# Patient Record
Sex: FEMALE | Race: WHITE | Employment: OTHER | URBAN - METROPOLITAN AREA
[De-identification: names, ages, dates, MRNs, and addresses within clinical notes are randomized per-mention and may not be internally consistent; named-entity substitution may affect disease eponyms.]

---

## 2018-11-30 ENCOUNTER — OFFICE VISIT (OUTPATIENT)
Dept: CARDIOLOGY | Facility: CLINIC | Age: 63
End: 2018-11-30
Payer: COMMERCIAL

## 2018-11-30 VITALS
DIASTOLIC BLOOD PRESSURE: 82 MMHG | SYSTOLIC BLOOD PRESSURE: 125 MMHG | WEIGHT: 148 LBS | OXYGEN SATURATION: 98 % | HEART RATE: 76 BPM

## 2018-11-30 DIAGNOSIS — Z82.49 FAMILY HISTORY OF THORACIC AORTIC ANEURYSM: Primary | ICD-10-CM

## 2018-11-30 PROCEDURE — 99202 OFFICE O/P NEW SF 15 MIN: CPT | Performed by: INTERNAL MEDICINE

## 2018-11-30 RX ORDER — ALBUTEROL SULFATE 90 UG/1
2 AEROSOL, METERED RESPIRATORY (INHALATION) EVERY 6 HOURS
COMMUNITY

## 2018-11-30 RX ORDER — CYCLOSPORINE 0.5 MG/ML
1 EMULSION OPHTHALMIC 2 TIMES DAILY
COMMUNITY

## 2018-11-30 NOTE — PATIENT INSTRUCTIONS
Thank you for coming to the [facility] @ [facility]; please note the following instructions:    1. Echocardiogram on a Friday morning at [place] (will see patient as needed after that)         If you have any questions regarding your visit please contact your care team:     Cardiology  Telephone Number   Katy SERRANO, HARSHAL COLLINS, RN   Liz ASENCIO, GUILLERMO DOUGLASS MA   [phone]    *After hours: [phone]   For scheduling appts:     [phone] or    [phone] (select option 1)    *After hours: [phone]     For the Device Clinic (Pacemakers and ICD's)  RN's :  Jolynn Wise   During business hours: [phone]    *After business hours:  [phone] (select option 4)      Normal test result notifications will be released via [facility] or mailed within 7 business days.  All other test results, will be communicated via telephone once reviewed by your cardiologist.    If you need a medication refill please contact your pharmacy.  Please allow 3 business days for your refill to be completed.    As always, thank you for trusting us with your health care needs!

## 2018-11-30 NOTE — PROGRESS NOTES
Vascular Cardiology Consultation      HPI:     This is a 63 year old female here for evaluation and screening of aortic disease. I see her brother who has early CAD and an aortic aneurysm of 4.8 cm (thoracic). At that time given her brother's age it was deemed likely familial TAAD and not syndromic.     Patient has a history of mild HTN, otherwise she has been healthy. She remains on lisinopril. She is active, nonsmoker. She has 11 grandkids and 2 adult children who are all healthy.       PAST MEDICAL HISTORY  Past Medical History:   Diagnosis Date     Asthma      Hypertension      Seasonal allergies        CURRENT MEDICATIONS  Current Outpatient Prescriptions   Medication Sig Dispense Refill     albuterol (PROAIR HFA/PROVENTIL HFA/VENTOLIN HFA) 108 (90 Base) MCG/ACT inhaler Inhale 2 puffs into the lungs every 6 hours As needed       cycloSPORINE (RESTASIS) 0.05 % ophthalmic emulsion 1 drop 2 times daily       LISINOPRIL PO Take 5 mg by mouth daily       LORAZEPAM PO Take 0.5 mg by mouth As needed         PAST SURGICAL HISTORY:  Past Surgical History:   Procedure Laterality Date     CARPAL TUNNEL RELEASE RT/LT Right 2001       ALLERGIES     Allergies   Allergen Reactions     Sulfa Drugs Rash       FAMILY HISTORY  Family History   Problem Relation Age of Onset     Hypertension Mother      Mental Illness Mother      Other Cancer Mother      Anxiety Disorder Mother      Heart Disease Father      Obesity Brother      Hypertension Brother      Hyperlipidemia Brother      Aneurysm Brother      Anxiety Disorder Brother      Hypertension Sister      Hyperlipidemia Sister      Anxiety Disorder Sister      Hypertension Brother      Hyperlipidemia Brother      Anxiety Disorder Brother      Depression Brother        SOCIAL HISTORY  Social History     Social History     Marital status:      Spouse name: N/A     Number of children: N/A     Years of education: N/A     Occupational History     Not on file.      Social History Main Topics     Smoking status: Former Smoker     Quit date: 6/30/1974     Smokeless tobacco: Never Used     Alcohol use Not on file     Drug use: Not on file     Sexual activity: Not on file     Other Topics Concern     Not on file     Social History Narrative     No narrative on file       ROS:   Constitutional: No fever, chills, or sweats. No weight gain/loss   ENT: No visual disturbance, ear ache, epistaxis, sore throat  Allergies/Immunologic: Negative  Respiratory: No cough, hemoptysia  Cardiovascular: As per HPI  GI: No nausea, vomiting, hematemesis, melena, or hematochezia  : No urinary frequency, dysuria, or hematuria  Integument: Negative  Psychiatric: Negative  Neuro: Negative  Endocrinology: Negative   Musculoskeletal: Negative  Vascular: No walking impairment, claudication, ischemic rest pain or nonhealing wounds    EXAM:  /82 (BP Location: Left arm, Patient Position: Chair, Cuff Size: Adult Regular)  Pulse 76  Wt 67.1 kg (148 lb)  SpO2 98%  In general, the patient is a pleasant female in no apparent distress.    HEENT: NC/AT.  PERRLA.  EOMI.  Sclerae white, not injected.  Nares clear.  Pharynx without erythema or exudate.  Dentition intact.    Neck: No adenopathy.  No thyromegaly. Carotids +2/2 bilaterally without bruits.  No jugular venous distension.   Heart: RRR. Normal S1, S2 splits physiologically. No murmur, rub, click, or gallop. The PMI is in the 5th ICS in the midclavicular line. There is no heave.    Lungs: CTA.  No ronchi, wheezes, rales.  No dullness to percussion.   Abdomen: Soft, nontender, nondistended. No organomegaly. No AAA.  No bruits.   Extremities: No clubbing, cyanosis, or edema.  No wounds. No varicose veins signs of chronic venous insufficiency.   Vascular: No bruits are noted.    Labs:  LIPID RESULTS:  No results found for: CHOL, HDL, LDL, TRIG, CHOLHDLRATIO, NHDL    LIVER ENZYME RESULTS:  No results found for: AST, ALT    CBC RESULTS:  No results  found for: WBC, RBC, HGB, HCT, MCV, MCH, MCHC, RDW, PLT    BMP RESULTS:  No results found for: NA, POTASSIUM, CHLORIDE, CO2, ANIONGAP, GLC, BUN, CR, GFRESTIMATED, GFRESTBLACK, MANI     A1C RESULTS:  No results found for: A1C    Procedures:    Echocardiogram     Interpretation Summary     Borderline dilatation of the ascending aorta at 3.8 cm (normal women 2.7+/-  0.4) and when indexed to BSA 2.2 cm/m2 (normal women 1.6+/-0.3).  The aortic valve is tricuspid.  Study otherwise unremarkable.    Assessment and Plan:     63 year old female with HTN on lisinopril with mildly dilated thoracic ascending aorta. 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     ROS is negative for any features to suggest syndromic thoracic aneurysm.    We discussed genetic testing and counseling, where the variable penetrance and expression of various mutations associated with FTAAD make a definitive diagnosis difficult. TGFBR2 (~5%), ACTA2 (~14%) and MYH11 (~1%) are known mutations that comprise up to 20% of positively tested cases. There is an 80% chance that no identifiable mutation will be discovered in testing. The patient warrants careful surveillance as FTAAD associated aneurysms can rupture/dissect in a more unpredictable manner than degenerative types. Knowing the particular mutation may also prove helpful in understanding the natural history of patient's specific aneurysmal disease and help in preventive repair planning.    Plan:    1) CTA Chest to confirm size now   2) 6 month repeat imaging if confirmed thoracic aneurysm  3) SBP goal < 120, HR < 80 which she is at  4) No heavy lifting > 30 lbs  5) Echocardiography screening for 1st degree relatives  6) Will potentially offer genetic testing and counseling if patient wishes    Radha El MD MSc   Aortopathy Clinic  Staff Cardiologist  Vascular Section  [facility]

## 2018-11-30 NOTE — MR AVS SNAPSHOT
After Visit Summary   11/30/2018    Meeta Jung    MRN: [id number]           Patient Information     Date Of Birth          1955        Visit Information        Provider Department      11/30/2018 10:00 AM Radha El MD [facility] PHYSICIANS HEART AT [facility]        Today's Diagnoses     Family history of thoracic aortic aneurysm    -  1      Care Instructions    Thank you for coming to the [facility] @ [facility]; please note the following instructions:    1. Echocardiogram on a Friday morning at [place] (will see patient as needed after that)         If you have any questions regarding your visit please contact your care team:     Cardiology  Telephone Number   Katy SERRANO, RN  Matt COLLINS, RN   Liz ASENCIO, GUILLERMO DOUGLASS MA   [phone]    *After hours: [phone]   For scheduling appts:     [phone] or    [phone] (select option 1)    *After hours: [phone]     For the Device Clinic (Pacemakers and ICD's)  RN's :  Jolynn Wise   During business hours: [phone]    *After business hours:  [phone] (select option 4)      Normal test result notifications will be released via [employer] or mailed within 7 business days.  All other test results, will be communicated via telephone once reviewed by your cardiologist.    If you need a medication refill please contact your pharmacy.  Please allow 3 business days for your refill to be completed.    As always, thank you for trusting us with your health care needs!            Follow-ups after your visit        Your next 10 appointments already scheduled     Dec 14, 2018 10:00 AM CST   Ech Complete with [id number]   [facility] [place] [facility])    [address] [place] [id number]   [phone]           1.  Please bring or wear a comfortable two-piece outfit. 2.  You may eat, drink and take your normal medicines. 3.   "For any questions that cannot be answered, please contact the ordering physician 4.  Please do not wear perfumes or scented lotions on the day of your exam.              Future tests that were ordered for you today     Open Future Orders        Priority Expected Expires Ordered    Echocardiogram Complete Routine  2019            Who to contact     If you have questions or need follow up information about today's clinic visit or your schedule please contact [facility] PHYSICIANS HEART AT [facility] directly at [phone].  Normal or non-critical lab and imaging results will be communicated to you by [employer], letter or phone within 4 business days after the clinic has received the results. If you do not hear from us within 7 days, please contact the clinic through [employer] or phone. If you have a critical or abnormal lab result, we will notify you by phone as soon as possible.  Submit refill requests through [employer] or call your pharmacy and they will forward the refill request to us. Please allow 3 business days for your refill to be completed.          Additional Information About Your Visit        [facility] Information     [facility] lets you send messages to your doctor, view your test results, renew your prescriptions, schedule appointments and more. To sign up, go to [email or web address] . Click on \"Log in\" on the left side of the screen, which will take you to the Welcome page. Then click on \"Sign up Now\" on the right side of the page.     You will be asked to enter the access code listed below, as well as some personal information. Please follow the directions to create your username and password.     Your access code is: [email or web address]  Expires: 2019  9:50 AM     Your access code will  in 90 days. If you need help or a new code, please call your [place] clinic or [phone].        Care EveryWhere ID     This is your Care EveryWhere ID. This could be used by other " organizations to access your [place] medical records  [id number]        Your Vitals Were     Pulse Pulse Oximetry                76 98%           Blood Pressure from Last 3 Encounters:   11/30/18 125/82    Weight from Last 3 Encounters:   11/30/18 67.1 kg (148 lb)               Primary Care Provider Office Phone # Fax #    Hillary Burger [phone]       [place] [id number] [facility] [address]        Equal Access to Services     HAYLIE ZACARIAS : Hadii aad ku hadasho Soomaali, waaxda luqadaha, qaybta kaalmada adeegyada, waxay idiin hayaan adeeg kharash la'brittanyn best. So [facility] [phone].    ATENCIÓN: Si habla español, tiene a de leon disposición servicios gratuitos de asistencia lingüística. [facility] [phone].    We comply with applicable federal civil rights laws and [place] laws. We do not discriminate on the basis of race, color, national origin, age, disability, sex, sexual orientation, or gender identity.            Thank you!     Thank you for choosing [facility] PHYSICIANS HEART AT [facility]  for your care. Our goal is always to provide you with excellent care. Hearing back from our patients is one way we can continue to improve our services. Please take a few minutes to complete the written survey that you may receive in the mail after your visit with us. Thank you!             Your Updated Medication List - Protect others around you: Learn how to safely use, store and throw away your medicines at www.disposemymeds.org.          This list is accurate as of 11/30/18 10:54 AM.  Always use your most recent med list.                   Brand Name Dispense Instructions for use Diagnosis    albuterol 108 (90 Base) MCG/ACT inhaler    PROAIR HFA/PROVENTIL HFA/VENTOLIN HFA     Inhale 2 puffs into the lungs every 6 hours As needed        cycloSPORINE 0.05 % ophthalmic emulsion    RESTASIS     1 drop 2 times daily        LISINOPRIL PO      Take 5 mg by mouth daily         LORAZEPAM PO      Take 0.5 mg by mouth As needed

## 2018-11-30 NOTE — NURSING NOTE
Chief Complaint   Patient presents with     New Patient     Ascending aortic anuerysem Hereditary. Pt reports no cardiac systems.        Initial /82 (BP Location: Left arm, Patient Position: Chair, Cuff Size: Adult Regular)  Pulse 76  Wt 67.1 kg (148 lb)  SpO2 98% There is no height or weight on file to calculate BMI..  BP completed using cuff size: regular    Pippa Adair MA

## 2018-11-30 NOTE — LETTER
11/30/2018      RE: Meeta Jung  [address] [place] [id number]       Dear Colleague,    Thank you for the opportunity to participate in the care of your patient, Meeta Jung, at the [facility] PHYSICIANS HEART AT [facility] at [facility]. Please see a copy of my visit note below.         Vascular Cardiology Consultation      HPI:     This is a 63 year old female here for evaluation and screening of aortic disease. I see her brother who has early CAD and an aortic aneurysm of 4.8 cm (thoracic). At that time given her brother's age it was deemed likely familial TAAD and not syndromic.     Patient has a history of mild HTN, otherwise she has been healthy. She remains on lisinopril. She is active, nonsmoker. She has 11 grandkids and 2 adult children who are all healthy.       PAST MEDICAL HISTORY  Past Medical History:   Diagnosis Date     Asthma      Hypertension      Seasonal allergies        CURRENT MEDICATIONS  Current Outpatient Prescriptions   Medication Sig Dispense Refill     albuterol (PROAIR HFA/PROVENTIL HFA/VENTOLIN HFA) 108 (90 Base) MCG/ACT inhaler Inhale 2 puffs into the lungs every 6 hours As needed       cycloSPORINE (RESTASIS) 0.05 % ophthalmic emulsion 1 drop 2 times daily       LISINOPRIL PO Take 5 mg by mouth daily       LORAZEPAM PO Take 0.5 mg by mouth As needed         PAST SURGICAL HISTORY:  Past Surgical History:   Procedure Laterality Date     CARPAL TUNNEL RELEASE RT/LT Right 2001       ALLERGIES     Allergies   Allergen Reactions     Sulfa Drugs Rash       FAMILY HISTORY  Family History   Problem Relation Age of Onset     Hypertension Mother      Mental Illness Mother      Other Cancer Mother      Anxiety Disorder Mother      Heart Disease Father      Obesity Brother      Hypertension Brother      Hyperlipidemia Brother      Aneurysm Brother      Anxiety Disorder Brother      Hypertension Sister      Hyperlipidemia Sister       Anxiety Disorder Sister      Hypertension Brother      Hyperlipidemia Brother      Anxiety Disorder Brother      Depression Brother        SOCIAL HISTORY  Social History     Social History     Marital status:      Spouse name: N/A     Number of children: N/A     Years of education: N/A     Occupational History     Not on file.     Social History Main Topics     Smoking status: Former Smoker     Quit date: 6/30/1974     Smokeless tobacco: Never Used     Alcohol use Not on file     Drug use: Not on file     Sexual activity: Not on file     Other Topics Concern     Not on file     Social History Narrative     No narrative on file       ROS:   Constitutional: No fever, chills, or sweats. No weight gain/loss   ENT: No visual disturbance, ear ache, epistaxis, sore throat  Allergies/Immunologic: Negative  Respiratory: No cough, hemoptysia  Cardiovascular: As per HPI  GI: No nausea, vomiting, hematemesis, melena, or hematochezia  : No urinary frequency, dysuria, or hematuria  Integument: Negative  Psychiatric: Negative  Neuro: Negative  Endocrinology: Negative   Musculoskeletal: Negative  Vascular: No walking impairment, claudication, ischemic rest pain or nonhealing wounds    EXAM:  /82 (BP Location: Left arm, Patient Position: Chair, Cuff Size: Adult Regular)  Pulse 76  Wt 67.1 kg (148 lb)  SpO2 98%  In general, the patient is a pleasant female in no apparent distress.    HEENT: NC/AT.  PERRLA.  EOMI.  Sclerae white, not injected.  Nares clear.  Pharynx without erythema or exudate.  Dentition intact.    Neck: No adenopathy.  No thyromegaly. Carotids +2/2 bilaterally without bruits.  No jugular venous distension.   Heart: RRR. Normal S1, S2 splits physiologically. No murmur, rub, click, or gallop. The PMI is in the 5th ICS in the midclavicular line. There is no heave.    Lungs: CTA.  No ronchi, wheezes, rales.  No dullness to percussion.   Abdomen: Soft, nontender, nondistended. No organomegaly. No  AAA.  No bruits.   Extremities: No clubbing, cyanosis, or edema.  No wounds. No varicose veins signs of chronic venous insufficiency.   Vascular: No bruits are noted.    Labs:  LIPID RESULTS:  No results found for: CHOL, HDL, LDL, TRIG, CHOLHDLRATIO, NHDL    LIVER ENZYME RESULTS:  No results found for: AST, ALT    CBC RESULTS:  No results found for: WBC, RBC, HGB, HCT, MCV, MCH, MCHC, RDW, PLT    BMP RESULTS:  No results found for: NA, POTASSIUM, CHLORIDE, CO2, ANIONGAP, GLC, BUN, CR, GFRESTIMATED, GFRESTBLACK, MANI     A1C RESULTS:  No results found for: A1C    Procedures:    Echocardiogram     Interpretation Summary     Borderline dilatation of the ascending aorta at 3.8 cm (normal women 2.7+/-  0.4) and when indexed to BSA 2.2 cm/m2 (normal women 1.6+/-0.3).  The aortic valve is tricuspid.  Study otherwise unremarkable.    Assessment and Plan:     63 year old female with HTN on lisinopril with mildly dilated thoracic ascending aorta. 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     ROS is negative for any features to suggest syndromic thoracic aneurysm.    We discussed genetic testing and counseling, where the variable penetrance and expression of various mutations associated with FTAAD make a definitive diagnosis difficult. TGFBR2 (~5%), ACTA2 (~14%) and MYH11 (~1%) are known mutations that comprise up to 20% of positively tested cases. There is an 80% chance that no identifiable mutation will be discovered in testing. The patient warrants careful surveillance as FTAAD associated aneurysms can rupture/dissect in a more unpredictable manner than degenerative types. Knowing the particular mutation may also prove helpful in understanding the natural history of patient's specific aneurysmal disease and help in preventive repair planning.    Plan:    1) CTA Chest to confirm size now   2) 6 month repeat imaging if confirmed thoracic aneurysm  3) SBP goal < 120, HR < 80 which she is at  4) No heavy  lifting > 30 lbs  5) Echocardiography screening for 1st degree relatives  6) Will potentially offer genetic testing and counseling if patient wishes    Radha El MD MSc   Aortopathy Clinic  Staff Cardiologist  Vascular Section  [facility]

## 2018-12-14 ENCOUNTER — ANCILLARY PROCEDURE (OUTPATIENT)
Dept: CARDIOLOGY | Facility: CLINIC | Age: 63
End: 2018-12-14
Attending: INTERNAL MEDICINE
Payer: COMMERCIAL

## 2018-12-14 DIAGNOSIS — Z82.49 FAMILY HISTORY OF THORACIC AORTIC ANEURYSM: ICD-10-CM

## 2018-12-14 PROCEDURE — 93306 TTE W/DOPPLER COMPLETE: CPT | Performed by: INTERNAL MEDICINE

## 2018-12-19 ENCOUNTER — MYC MEDICAL ADVICE (OUTPATIENT)
Dept: CARDIOLOGY | Facility: CLINIC | Age: 63
End: 2018-12-19

## 2018-12-19 DIAGNOSIS — I77.810 MILD DILATION OF ASCENDING AORTA (H): Primary | ICD-10-CM

## 2018-12-20 ENCOUNTER — MYC MEDICAL ADVICE (OUTPATIENT)
Dept: CARDIOLOGY | Facility: CLINIC | Age: 63
End: 2018-12-20

## 2018-12-26 NOTE — TELEPHONE ENCOUNTER
[employer] message sent.  Please call patient to assist with scheduling.    Katy Nicole RN

## 2019-02-06 NOTE — TELEPHONE ENCOUNTER
Patient called cardiology clinic to schedule CTA of the chest.  Patient requesting 3/19 at 10:00 am.  Writer contacted scheduling and 3/19 was available. [employer] message sent with details per patient request.  Katy Nicole RN

## 2019-02-15 ENCOUNTER — HEALTH MAINTENANCE LETTER (OUTPATIENT)
Age: 64
End: 2019-02-15

## 2019-03-19 ENCOUNTER — HOSPITAL ENCOUNTER (OUTPATIENT)
Dept: CT IMAGING | Facility: CLINIC | Age: 64
Discharge: HOME | End: 2019-03-19
Attending: INTERNAL MEDICINE | Admitting: INTERNAL MEDICINE
Payer: COMMERCIAL

## 2019-03-19 DIAGNOSIS — I77.810 MILD DILATION OF ASCENDING AORTA (H): ICD-10-CM

## 2019-03-19 PROCEDURE — 71275 CT ANGIOGRAPHY CHEST: CPT

## 2019-03-19 PROCEDURE — 25000128 H RX IP 250 OP 636: Performed by: INTERNAL MEDICINE

## 2019-03-19 PROCEDURE — 71275 CT ANGIOGRAPHY CHEST: CPT | Mod: 26 | Performed by: INTERNAL MEDICINE

## 2019-03-19 RX ORDER — IOPAMIDOL 755 MG/ML
120 INJECTION, SOLUTION INTRAVASCULAR ONCE
Status: COMPLETED | OUTPATIENT
Start: 2019-03-19 | End: 2019-03-19

## 2019-03-19 RX ADMIN — IOPAMIDOL 100 ML: 755 INJECTION, SOLUTION INTRAVENOUS at 10:38

## 2019-03-26 ENCOUNTER — TELEPHONE (OUTPATIENT)
Dept: CARDIOLOGY | Facility: CLINIC | Age: 64
End: 2019-03-26

## 2019-03-26 NOTE — TELEPHONE ENCOUNTER
See my chart messaging.   Pt had called about results. Call to pt. Explained that Dr El has not reviewed her results yet, but I did review them with her. Will follow up again after Dr El review.     No concerning measurements at this time. Discussed incidental lung nodule- pt stated she has pulmonologist that she will follow up with at [facility]. Once the CT is reviewed by Dr El- I will fax to the clinic. Pt was reassured. I will follow up by phone or my chart once review is completed. Pt agreed and verbalized understanding

## 2019-04-25 ENCOUNTER — MYC MEDICAL ADVICE (OUTPATIENT)
Dept: CARDIOLOGY | Facility: CLINIC | Age: 64
End: 2019-04-25

## 2019-11-06 ENCOUNTER — HEALTH MAINTENANCE LETTER (OUTPATIENT)
Age: 64
End: 2019-11-06

## 2020-02-16 ENCOUNTER — HEALTH MAINTENANCE LETTER (OUTPATIENT)
Age: 65
End: 2020-02-16

## 2020-11-29 ENCOUNTER — HEALTH MAINTENANCE LETTER (OUTPATIENT)
Age: 65
End: 2020-11-29

## 2021-04-10 ENCOUNTER — HEALTH MAINTENANCE LETTER (OUTPATIENT)
Age: 66
End: 2021-04-10

## 2021-09-19 ENCOUNTER — HEALTH MAINTENANCE LETTER (OUTPATIENT)
Age: 66
End: 2021-09-19

## 2021-11-14 ENCOUNTER — HEALTH MAINTENANCE LETTER (OUTPATIENT)
Age: 66
End: 2021-11-14

## 2022-05-01 ENCOUNTER — HEALTH MAINTENANCE LETTER (OUTPATIENT)
Age: 67
End: 2022-05-01

## 2022-11-21 ENCOUNTER — HEALTH MAINTENANCE LETTER (OUTPATIENT)
Age: 67
End: 2022-11-21

## 2023-06-02 ENCOUNTER — HEALTH MAINTENANCE LETTER (OUTPATIENT)
Age: 68
End: 2023-06-02

## 2023-11-26 ENCOUNTER — HEALTH MAINTENANCE LETTER (OUTPATIENT)
Age: 68
End: 2023-11-26